# Patient Record
Sex: MALE | Race: WHITE | Employment: STUDENT | URBAN - METROPOLITAN AREA
[De-identification: names, ages, dates, MRNs, and addresses within clinical notes are randomized per-mention and may not be internally consistent; named-entity substitution may affect disease eponyms.]

---
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
## 2024-08-27 ENCOUNTER — NURSE TRIAGE (OUTPATIENT)
Dept: OTHER | Facility: CLINIC | Age: 19
End: 2024-08-27

## 2024-08-27 NOTE — TELEPHONE ENCOUNTER
Location of patient: [place]    Location of employment: [employer] where injury occurred: Pharmacy IV room    Location of injury (body part involved): eyes    Time of injury: 10:45 8/27/2024    Last 4 of SSN: [id number]    Location associate referred to for care: home care    Subjective: Caller states \"drawing up Bumex and too much air was in the vial and caused a backsplash\"     Current Symptoms:   none    Pain Severity: 0/10;     Temperature: NA     What has been tried:   -flushed at eyewash station for 15 minutes    LMP: NA Pregnant: NA    Recommended disposition: Home Care    Employee injury packet sent to employee with listing of approved providers and locations. Employee aware they must be seen by one of the panel physicians, not their own PCP. Employee verbalizes understanding.      Care advice provided, caller verbalizes understanding; denies any other questions or concerns.    Home care and advised to callback with any concerns regarding new or worsening symptoms

## 2025-03-25 ENCOUNTER — HOSPITAL ENCOUNTER (EMERGENCY)
Age: 20
Discharge: HOME | End: 2025-03-25

## 2025-03-25 VITALS
WEIGHT: 135 LBS | HEIGHT: 73 IN | BODY MASS INDEX: 17.89 KG/M2 | HEART RATE: 66 BPM | RESPIRATION RATE: 16 BRPM | SYSTOLIC BLOOD PRESSURE: 113 MMHG | DIASTOLIC BLOOD PRESSURE: 69 MMHG | OXYGEN SATURATION: 100 % | TEMPERATURE: 97 F

## 2025-03-25 DIAGNOSIS — J01.40 ACUTE PANSINUSITIS, RECURRENCE NOT SPECIFIED: Primary | ICD-10-CM

## 2025-03-25 PROCEDURE — 99203 OFFICE O/P NEW LOW 30 MIN: CPT

## 2025-03-25 PROCEDURE — 99213 OFFICE O/P EST LOW 20 MIN: CPT | Performed by: NURSE PRACTITIONER

## 2025-03-25 RX ORDER — PREDNISONE 20 MG/1
40 TABLET ORAL DAILY
Qty: 10 TABLET | Refills: 0 | Status: SHIPPED | OUTPATIENT
Start: 2025-03-25 | End: 2025-03-30

## 2025-03-25 RX ORDER — AMOXICILLIN 500 MG/1
500 CAPSULE ORAL 2 TIMES DAILY
Qty: 20 CAPSULE | Refills: 0 | Status: SHIPPED | OUTPATIENT
Start: 2025-03-25 | End: 2025-04-04

## 2025-03-25 ASSESSMENT — PAIN SCALES - GENERAL: PAINLEVEL_OUTOF10: 0

## 2025-03-25 ASSESSMENT — ENCOUNTER SYMPTOMS
COUGH: 0
NAUSEA: 0
SORE THROAT: 0
DIARRHEA: 0
RHINORRHEA: 1
VOMITING: 0
SINUS PRESSURE: 1
SHORTNESS OF BREATH: 0
CHEST TIGHTNESS: 0

## 2025-03-25 ASSESSMENT — PAIN - FUNCTIONAL ASSESSMENT: PAIN_FUNCTIONAL_ASSESSMENT: 0-10

## 2025-03-25 NOTE — ED PROVIDER NOTES
[facility] URGENT CARE  Urgent Care Encounter       CHIEF COMPLAINT       Chief Complaint   Patient presents with    Facial Pain     Sinus congestion and pressure    Nasal Congestion    Letter for School/Work     Work slip for tommorrow       Nurses Notes reviewed and I agree except as noted in the HPI.  HISTORY OF PRESENT ILLNESS   Tramaine Leong is a 19 y.o. male who presents to the [facility] urgent care for evaluation of sinus pressure.  Reports the symptoms started roughly 6 to 7 days ago.  Denies known exposure to someone ill.  Does report congestion, rhinorrhea, and sinus pressure.  Denies fever or chills.    The history is provided by the patient. No  was used.       REVIEW OF SYSTEMS     Review of Systems   Constitutional:  Negative for activity change, appetite change, chills, fatigue and fever.   HENT:  Positive for congestion, rhinorrhea and sinus pressure. Negative for ear discharge, ear pain and sore throat.    Respiratory:  Negative for cough, chest tightness and shortness of breath.    Cardiovascular:  Negative for chest pain.   Gastrointestinal:  Negative for diarrhea, nausea and vomiting.   Genitourinary:  Negative for dysuria.   Skin:  Negative for rash.   Allergic/Immunologic: Negative for environmental allergies and food allergies.   Neurological:  Negative for dizziness and headaches.       PAST MEDICAL HISTORY   History reviewed. No pertinent past medical history.    SURGICALHISTORY     Patient  has no past surgical history on file.    CURRENT MEDICATIONS       Discharge Medication List as of 3/25/2025 10:08 AM        CONTINUE these medications which have NOT CHANGED    Details   hydrOXYzine HCl (ATARAX) 25 MG tablet TAKE 1/2 TO 1 TABLET BY MOUTH EVERY EIGHT HOURS AS NEEDED FOR ANXIETY OR ANGER, Disp-30 tablet, R-0Normal      citalopram (CELEXA) 10 MG tablet Take 1 tablet by mouth daily, Disp-90 tablet, R-1Normal      Multiple Vitamins-Minerals (THERAPEUTIC